# Patient Record
Sex: MALE | URBAN - METROPOLITAN AREA
[De-identification: names, ages, dates, MRNs, and addresses within clinical notes are randomized per-mention and may not be internally consistent; named-entity substitution may affect disease eponyms.]

---

## 2022-07-26 ENCOUNTER — REFRACTIVE (OUTPATIENT)
Dept: URBAN - METROPOLITAN AREA CLINIC 42 | Facility: CLINIC | Age: 47
End: 2022-07-26

## 2022-07-26 DIAGNOSIS — H52.03 HYPERMETROPIA, BILATERAL: Primary | ICD-10-CM

## 2022-07-26 ASSESSMENT — VISUAL ACUITY
OS: 20/20
OD: 20/20

## 2022-07-26 NOTE — IMPRESSION/PLAN
Impression: Hypermetropia, bilateral: H52.03. Plan: Possible skewing OU, reverse horseshoe rule out pellucid. Pt need to go to [facility] office to get testing Pentacam and repeat topos OU to determine LASIK candidacy.